# Patient Record
(demographics unavailable — no encounter records)

---

## 2025-06-11 NOTE — HISTORY OF PRESENT ILLNESS
[Postpartum Follow Up] : postpartum follow up [Last Pap Date: ___] : Last Pap Date: [unfilled] [Delivery Date: ___] : on [unfilled] [] : delivered by vaginal delivery [Male] : Delivery History: baby boy [Wt. ___] : weighing [unfilled] [Breastfeeding] : currently nursing [S/Sx PP Depression] : no signs/symptoms of postpartum depression [Back to Normal] : is back to normal in size [Mild] : mild vaginal bleeding [Normal] : the vagina was normal [Cervix Sample Taken] : cervical sample taken for a Pap smear [Examination Of The Breasts] : breasts are normal [Doing Well] : is doing well [None] : None [de-identified] : phq2 neg  [FreeTextEntry1] :  20yo p1001 presents for a postpartum f/u visit. Pt is breastfeeding and declines vaginal bleeding, discharge or perineal pain. Contraception preferences discussed, and declines birth control at this time. Pap sent today.    ob hx:   x  1 largest 7.3lbs    gyn hx: last pap: 25    Med/Surg Hx: ulcerative colitis    meds: none    nka   nonsmoker

## 2025-06-11 NOTE — HISTORY OF PRESENT ILLNESS
[Postpartum Follow Up] : postpartum follow up [Last Pap Date: ___] : Last Pap Date: [unfilled] [Delivery Date: ___] : on [unfilled] [] : delivered by vaginal delivery [Male] : Delivery History: baby boy [Wt. ___] : weighing [unfilled] [Breastfeeding] : currently nursing [S/Sx PP Depression] : no signs/symptoms of postpartum depression [Back to Normal] : is back to normal in size [Mild] : mild vaginal bleeding [Normal] : the vagina was normal [Cervix Sample Taken] : cervical sample taken for a Pap smear [Doing Well] : is doing well [Examination Of The Breasts] : breasts are normal [None] : None [de-identified] : phq2 neg  [FreeTextEntry1] :  20yo p1001 presents for a postpartum f/u visit. Pt is breastfeeding and declines vaginal bleeding, discharge or perineal pain. Contraception preferences discussed, and declines birth control at this time. Pap sent today.    ob hx:   x  1 largest 7.3lbs    gyn hx: last pap: 25    Med/Surg Hx: ulcerative colitis    meds: none    nka   nonsmoker